# Patient Record
Sex: FEMALE | Race: WHITE | NOT HISPANIC OR LATINO | Employment: FULL TIME | URBAN - NONMETROPOLITAN AREA
[De-identification: names, ages, dates, MRNs, and addresses within clinical notes are randomized per-mention and may not be internally consistent; named-entity substitution may affect disease eponyms.]

---

## 2017-04-23 ENCOUNTER — TRANSCRIBE ORDERS (OUTPATIENT)
Dept: ADMINISTRATIVE | Facility: HOSPITAL | Age: 37
End: 2017-04-23

## 2017-04-23 ENCOUNTER — LAB (OUTPATIENT)
Dept: LAB | Facility: HOSPITAL | Age: 37
End: 2017-04-23

## 2017-04-23 DIAGNOSIS — O00.90 ECTOPIC PREGNANCY, UNSPECIFIED LOCATION, UNSPECIFIED WHETHER INTRAUTERINE PREGNANCY PRESENT: ICD-10-CM

## 2017-04-23 DIAGNOSIS — O00.90 ECTOPIC PREGNANCY, UNSPECIFIED LOCATION, UNSPECIFIED WHETHER INTRAUTERINE PREGNANCY PRESENT: Primary | ICD-10-CM

## 2017-04-23 PROCEDURE — 84702 CHORIONIC GONADOTROPIN TEST: CPT

## 2017-04-23 PROCEDURE — 36415 COLL VENOUS BLD VENIPUNCTURE: CPT

## 2017-04-25 LAB — HCG INTACT+B SERPL-ACNC: 126 MIU/ML

## 2017-04-26 ENCOUNTER — LAB (OUTPATIENT)
Dept: LAB | Facility: HOSPITAL | Age: 37
End: 2017-04-26

## 2017-04-26 DIAGNOSIS — O00.90 ECTOPIC PREGNANCY, UNSPECIFIED LOCATION, UNSPECIFIED WHETHER INTRAUTERINE PREGNANCY PRESENT: ICD-10-CM

## 2017-04-26 LAB — HCG INTACT+B SERPL-ACNC: 45 MIU/ML (ref 0–5)

## 2017-04-26 PROCEDURE — 36415 COLL VENOUS BLD VENIPUNCTURE: CPT

## 2017-04-26 PROCEDURE — 84702 CHORIONIC GONADOTROPIN TEST: CPT | Performed by: OBSTETRICS & GYNECOLOGY

## 2017-05-01 ENCOUNTER — LAB (OUTPATIENT)
Dept: LAB | Facility: HOSPITAL | Age: 37
End: 2017-05-01

## 2017-05-01 ENCOUNTER — TRANSCRIBE ORDERS (OUTPATIENT)
Dept: ADMINISTRATIVE | Facility: HOSPITAL | Age: 37
End: 2017-05-01

## 2017-05-01 DIAGNOSIS — Z87.59 HX OF ECTOPIC PREGNANCY: Primary | ICD-10-CM

## 2017-05-01 DIAGNOSIS — Z87.59 HX OF ECTOPIC PREGNANCY: ICD-10-CM

## 2017-05-01 LAB — HCG INTACT+B SERPL-ACNC: 11 MIU/ML (ref 0–5)

## 2017-05-01 PROCEDURE — 84702 CHORIONIC GONADOTROPIN TEST: CPT | Performed by: OBSTETRICS & GYNECOLOGY

## 2017-05-01 PROCEDURE — 36415 COLL VENOUS BLD VENIPUNCTURE: CPT

## 2017-05-05 ENCOUNTER — TRANSCRIBE ORDERS (OUTPATIENT)
Dept: ADMINISTRATIVE | Facility: HOSPITAL | Age: 37
End: 2017-05-05

## 2017-05-05 ENCOUNTER — LAB (OUTPATIENT)
Dept: LAB | Facility: HOSPITAL | Age: 37
End: 2017-05-05

## 2017-05-05 DIAGNOSIS — O00.90 ECTOPIC PREGNANCY, UNSPECIFIED LOCATION, UNSPECIFIED WHETHER INTRAUTERINE PREGNANCY PRESENT: Primary | ICD-10-CM

## 2017-05-05 DIAGNOSIS — O00.90 ECTOPIC PREGNANCY, UNSPECIFIED LOCATION, UNSPECIFIED WHETHER INTRAUTERINE PREGNANCY PRESENT: ICD-10-CM

## 2017-05-05 LAB — HCG INTACT+B SERPL-ACNC: 5 MIU/ML (ref 0–5)

## 2017-05-05 PROCEDURE — 36415 COLL VENOUS BLD VENIPUNCTURE: CPT

## 2017-05-05 PROCEDURE — 84702 CHORIONIC GONADOTROPIN TEST: CPT | Performed by: OBSTETRICS & GYNECOLOGY

## 2019-04-25 ENCOUNTER — OFFICE VISIT (OUTPATIENT)
Dept: RETAIL CLINIC | Facility: CLINIC | Age: 39
End: 2019-04-25

## 2019-04-25 VITALS
OXYGEN SATURATION: 98 % | TEMPERATURE: 98.9 F | SYSTOLIC BLOOD PRESSURE: 124 MMHG | HEIGHT: 63 IN | WEIGHT: 193 LBS | RESPIRATION RATE: 20 BRPM | HEART RATE: 74 BPM | DIASTOLIC BLOOD PRESSURE: 80 MMHG | BODY MASS INDEX: 34.2 KG/M2

## 2019-04-25 DIAGNOSIS — J02.0 STREP PHARYNGITIS: Primary | ICD-10-CM

## 2019-04-25 LAB
EXPIRATION DATE: ABNORMAL
INTERNAL CONTROL: ABNORMAL
Lab: ABNORMAL
S PYO AG THROAT QL: POSITIVE

## 2019-04-25 PROCEDURE — 99203 OFFICE O/P NEW LOW 30 MIN: CPT | Performed by: NURSE PRACTITIONER

## 2019-04-25 PROCEDURE — 87880 STREP A ASSAY W/OPTIC: CPT | Performed by: NURSE PRACTITIONER

## 2019-04-25 RX ORDER — AMOXICILLIN 875 MG/1
875 TABLET, COATED ORAL 2 TIMES DAILY
Qty: 20 TABLET | Refills: 0 | Status: SHIPPED | OUTPATIENT
Start: 2019-04-25 | End: 2019-05-05

## 2019-04-25 RX ORDER — PNV NO.95/FERROUS FUM/FOLIC AC 28MG-0.8MG
TABLET ORAL
Refills: 2 | COMMUNITY
Start: 2019-03-28 | End: 2020-01-09

## 2019-04-25 RX ORDER — PRENATAL VIT NO.126/IRON/FOLIC 28MG-0.8MG
TABLET ORAL DAILY
COMMUNITY
End: 2020-01-09

## 2019-04-25 NOTE — PROGRESS NOTES
"GERSON@  Kristan Mayers is a 38 y.o. female.   Chief Complaint   Patient presents with   • Sore Throat      Sore Throat    This is a new problem. The current episode started yesterday. The problem has been unchanged. Maximum temperature: hasn't checked her temperature. Associated symptoms include congestion (nasal), swollen glands and trouble swallowing (mild). Pertinent negatives include no coughing or headaches. She has had exposure to strep. Exposure to:  had Strep Throat this past week. She has tried acetaminophen (took Tylenol last PM) for the symptoms. The treatment provided mild relief.        Kristan Mayers  presents to [facility] with cc of sore throat since yesterday evening. Reviewed the [facility]. Immunizations are up to date. See ROS.  The following portions of the patient's history were reviewed and updated as appropriate: allergies, current medications, past family history, past medical history, past social history, past surgical history and problem list.    Current Outpatient Medications:   •  Prenatal Vit-Fe Fumarate-FA (PRENATAL, CLASSIC, VITAMIN) 28-0.8 MG tablet tablet, Take  by mouth Daily., Disp: , Rfl:   •  amoxicillin (AMOXIL) 875 MG tablet, Take 1 tablet by mouth 2 (Two) Times a Day for 10 days., Disp: 20 tablet, Rfl: 0  •  Ferrous Sulfate (IRON) 325 (65 Fe) MG tablet, , Disp: , Rfl: 2    No Known Allergies    Review of Systems   Constitutional: Positive for chills. Negative for fever.   HENT: Positive for congestion (nasal), sore throat and trouble swallowing (mild).    Respiratory: Negative for cough.    Genitourinary: Positive for dysuria.   Musculoskeletal: Positive for myalgias.   Neurological: Negative for headaches.       Objective     Visit Vitals  /80   Pulse 74   Temp 98.9 °F (37.2 °C) (Oral)   Resp 20   Ht 160 cm (63\")   Wt 87.5 kg (193 lb)   SpO2 98%   BMI 34.19 kg/m²         Physical Exam   Constitutional: She is oriented to person, place, and time. She appears " well-developed and well-nourished. No distress.   HENT:   Head: Normocephalic and atraumatic.   Right Ear: Tympanic membrane, external ear and ear canal normal.   Left Ear: Tympanic membrane, external ear and ear canal normal.   Nose: Mucosal edema present.   Mouth/Throat: Uvula is midline and mucous membranes are normal. Posterior oropharyngeal erythema present. No oropharyngeal exudate. Tonsils are 2+ on the right. Tonsils are 2+ on the left. No tonsillar exudate.   Eyes: Conjunctivae and EOM are normal. Pupils are equal, round, and reactive to light.   Neck: Normal range of motion. Neck supple.   Cardiovascular: Normal rate, regular rhythm and normal heart sounds.   Pulmonary/Chest: Effort normal and breath sounds normal. No respiratory distress.   Abdominal: Soft. Bowel sounds are normal.   Lymphadenopathy:     She has no cervical adenopathy.   Neurological: She is alert and oriented to person, place, and time.   Skin: Skin is warm and dry. No rash noted.   Psychiatric: She has a normal mood and affect. Her behavior is normal. Judgment and thought content normal.   Nursing note and vitals reviewed.      Lab Results (last 24 hours)     Procedure Component Value Units Date/Time    POCT rapid strep A [[id number]]  (Abnormal) Collected:  04/25/19 1901    Specimen:  Swab Updated:  04/25/19 1902     Rapid Strep A Screen Positive     Internal Control Passed     Lot Number [id number]     Expiration Date 8/20          Assessment/Plan   Kristan was seen today for sore throat.    Diagnoses and all orders for this visit:    Strep pharyngitis  -     amoxicillin (AMOXIL) 875 MG tablet; Take 1 tablet by mouth 2 (Two) Times a Day for 10 days.  -     POCT rapid strep A

## 2019-04-25 NOTE — PATIENT INSTRUCTIONS
Strep Throat  Strep throat is a bacterial infection of the throat. Your health care provider may call the infection tonsillitis or pharyngitis, depending on whether there is swelling in the tonsils or at the back of the throat. Strep throat is most common during the cold months of the year in children who are 5-15 years of age, but it can happen during any season in people of any age. This infection is spread from person to person (contagious) through coughing, sneezing, or close contact.  What are the causes?  Strep throat is caused by the bacteria called Streptococcus pyogenes.  What increases the risk?  This condition is more likely to develop in:  · People who spend time in crowded places where the infection can spread easily.  · People who have close contact with someone who has strep throat.    What are the signs or symptoms?  Symptoms of this condition include:  · Fever or chills.  · Redness, swelling, or pain in the tonsils or throat.  · Pain or difficulty when swallowing.  · White or yellow spots on the tonsils or throat.  · Swollen, tender glands in the neck or under the jaw.  · Red rash all over the body (rare).    How is this diagnosed?  This condition is diagnosed by performing a rapid strep test or by taking a swab of your throat (throat culture test). Results from a rapid strep test are usually ready in a few minutes, but throat culture test results are available after one or two days.  How is this treated?  This condition is treated with antibiotic medicine.  Follow these instructions at home:  Medicines  · Take over-the-counter and prescription medicines only as told by your health care provider.  · Take your antibiotic as told by your health care provider. Do not stop taking the antibiotic even if you start to feel better.  · Have family members who also have a sore throat or fever tested for strep throat. They may need antibiotics if they have the strep infection.  Eating and drinking  · Do not  share food, drinking cups, or personal items that could cause the infection to spread to other people.  · If swallowing is difficult, try eating soft foods until your sore throat feels better.  · Drink enough fluid to keep your urine clear or pale yellow.  General instructions  · Gargle with a salt-water mixture 3-4 times per day or as needed. To make a salt-water mixture, completely dissolve ½-1 tsp of salt in 1 cup of warm water.  · Make sure that all household members wash their hands well.  · Get plenty of rest.  · Stay home from school or work until you have been taking antibiotics for 24 hours.  · Keep all follow-up visits as told by your health care provider. This is important.  Contact a health care provider if:  · The glands in your neck continue to get bigger.  · You develop a rash, cough, or earache.  · You cough up a thick liquid that is green, yellow-brown, or bloody.  · You have pain or discomfort that does not get better with medicine.  · Your problems seem to be getting worse rather than better.  · You have a fever.  Get help right away if:  · You have new symptoms, such as vomiting, severe headache, stiff or painful neck, chest pain, or shortness of breath.  · You have severe throat pain, drooling, or changes in your voice.  · You have swelling of the neck, or the skin on the neck becomes red and tender.  · You have signs of dehydration, such as fatigue, dry mouth, and decreased urination.  · You become increasingly sleepy, or you cannot wake up completely.  · Your joints become red or painful.  This information is not intended to replace advice given to you by your health care provider. Make sure you discuss any questions you have with your health care provider.  Document Released: 12/15/2001 Document Revised: 08/16/2017 Document Reviewed: 04/11/2016  [employer] Interactive Patient Education © 2019 [employer]

## 2019-12-04 ENCOUNTER — OFFICE VISIT (OUTPATIENT)
Dept: RETAIL CLINIC | Facility: CLINIC | Age: 39
End: 2019-12-04

## 2019-12-04 VITALS
SYSTOLIC BLOOD PRESSURE: 105 MMHG | BODY MASS INDEX: 32.43 KG/M2 | RESPIRATION RATE: 20 BRPM | TEMPERATURE: 99 F | WEIGHT: 183 LBS | DIASTOLIC BLOOD PRESSURE: 70 MMHG | HEIGHT: 63 IN | HEART RATE: 74 BPM | OXYGEN SATURATION: 98 %

## 2019-12-04 DIAGNOSIS — J06.9 ACUTE URI: Primary | ICD-10-CM

## 2019-12-04 LAB
EXPIRATION DATE: NORMAL
EXPIRATION DATE: NORMAL
FLUAV AG NPH QL: NEGATIVE
FLUBV AG NPH QL: NEGATIVE
INTERNAL CONTROL: NORMAL
INTERNAL CONTROL: NORMAL
Lab: NORMAL
Lab: NORMAL
S PYO AG THROAT QL: NEGATIVE

## 2019-12-04 PROCEDURE — 87880 STREP A ASSAY W/OPTIC: CPT | Performed by: NURSE PRACTITIONER

## 2019-12-04 PROCEDURE — 99213 OFFICE O/P EST LOW 20 MIN: CPT | Performed by: NURSE PRACTITIONER

## 2019-12-04 PROCEDURE — 87804 INFLUENZA ASSAY W/OPTIC: CPT | Performed by: NURSE PRACTITIONER

## 2019-12-04 NOTE — PROGRESS NOTES
"SUBJECTIVEBEGIN@  Kristan Mayers is a 39 y.o. female.   Chief Complaint   Patient presents with   • URI      URI    This is a new problem. The current episode started in the past 7 days (2 days). The problem has been waxing and waning. Maximum temperature: unknown. Associated symptoms include congestion (nasal), coughing (dry), headaches, a plugged ear sensation, rhinorrhea (clear), sneezing and a sore throat (mild). Pertinent negatives include no ear pain, joint pain, joint swelling or swollen glands. She has tried decongestant and acetaminophen (had a dose @ 5 am) for the symptoms. The treatment provided no relief.      Kristan Mayers  presents to [facility] with cc of Cold for 2 days. Reviewed the [facility], says she has had her Influenza Vaccine this year.  See ROS.  The following portions of the patient's history were reviewed and updated as appropriate: allergies, current medications, past family history, past medical history, past social history, past surgical history and problem list.    Current Outpatient Medications:   •  Ferrous Sulfate (IRON) 325 (65 Fe) MG tablet, , Disp: , Rfl: 2  •  Prenatal Vit-Fe Fumarate-FA (PRENATAL, CLASSIC, VITAMIN) 28-0.8 MG tablet tablet, Take  by mouth Daily., Disp: , Rfl:     No Known Allergies    Review of Systems   Constitutional: Positive for fatigue. Fever: unknown.   HENT: Positive for congestion (nasal), postnasal drip, rhinorrhea (clear), sneezing and sore throat (mild). Negative for ear pain and sinus pressure.    Respiratory: Positive for cough (dry).    Musculoskeletal: Negative for joint pain.   Neurological: Positive for headaches.       Objective     Visit Vitals  /70   Pulse 74   Temp 99 °F (37.2 °C) (Temporal)   Resp 20   Ht 160 cm (63\")   Wt 83 kg (183 lb)   LMP 11/20/2019 (Exact Date)   SpO2 98%   Breastfeeding? Yes   BMI 32.42 kg/m²         Physical Exam   Constitutional: She is oriented to person, place, and time. She appears well-developed and " well-nourished. No distress.   HENT:   Head: Normocephalic and atraumatic.   Right Ear: Tympanic membrane and external ear normal.   Left Ear: Tympanic membrane and external ear normal.   Nose: Mucosal edema present. Right sinus exhibits no maxillary sinus tenderness and no frontal sinus tenderness. Left sinus exhibits no maxillary sinus tenderness and no frontal sinus tenderness.   Mouth/Throat: Uvula is midline and mucous membranes are normal. Posterior oropharyngeal erythema present. No oropharyngeal exudate or tonsillar abscesses. Tonsils are 1+ on the right. Tonsils are 1+ on the left. No tonsillar exudate.   Eyes: Conjunctivae and EOM are normal. Pupils are equal, round, and reactive to light.   Neck: Normal range of motion. Neck supple.   Cardiovascular: Normal rate, regular rhythm and normal heart sounds.   Pulmonary/Chest: Effort normal and breath sounds normal. No respiratory distress. She has no wheezes.   Abdominal: Soft. Bowel sounds are normal.   Musculoskeletal: Normal range of motion.   Lymphadenopathy:     She has cervical adenopathy (mild tenderness on the left).   Neurological: She is alert and oriented to person, place, and time.   Skin: Skin is warm and dry. No rash noted.   Psychiatric: She has a normal mood and affect. Her behavior is normal. Judgment and thought content normal.   Nursing note and vitals reviewed.      Lab Results (last 24 hours)     Procedure Component Value Units Date/Time    POC Influenza A / B [[id number]]  (Normal) Collected:  12/04/19 1246    Specimen:  Swab Updated:  12/04/19 1246     Rapid Influenza A Ag Negative     Rapid Influenza B Ag Negative     Internal Control Passed     Lot Number [id number]     Expiration Date 10/22/21    POCT rapid strep A [[id number]]  (Normal) Collected:  12/04/19 1247    Specimen:  Swab Updated:  12/04/19 1247     Rapid Strep A Screen Negative     Internal Control Passed     Lot Number [id number]     Expiration Date 2/28/21           Assessment/Plan   Kristan was seen today for uri.    Diagnoses and all orders for this visit:    Acute URI  -     POCT rapid strep A  -     POC Influenza A / B

## 2019-12-04 NOTE — PATIENT INSTRUCTIONS

## 2020-01-09 ENCOUNTER — OFFICE VISIT (OUTPATIENT)
Dept: RETAIL CLINIC | Facility: CLINIC | Age: 40
End: 2020-01-09

## 2020-01-09 VITALS
WEIGHT: 182 LBS | OXYGEN SATURATION: 98 % | DIASTOLIC BLOOD PRESSURE: 88 MMHG | RESPIRATION RATE: 18 BRPM | BODY MASS INDEX: 32.25 KG/M2 | HEART RATE: 78 BPM | TEMPERATURE: 99.2 F | SYSTOLIC BLOOD PRESSURE: 112 MMHG | HEIGHT: 63 IN

## 2020-01-09 DIAGNOSIS — J06.9 URI, ACUTE: Primary | ICD-10-CM

## 2020-01-09 DIAGNOSIS — R11.0 NAUSEA: ICD-10-CM

## 2020-01-09 LAB
EXPIRATION DATE: NORMAL
FLUAV AG NPH QL: NEGATIVE
FLUBV AG NPH QL: NEGATIVE
INTERNAL CONTROL: NORMAL
Lab: NORMAL

## 2020-01-09 PROCEDURE — 87804 INFLUENZA ASSAY W/OPTIC: CPT | Performed by: NURSE PRACTITIONER

## 2020-01-09 PROCEDURE — 99213 OFFICE O/P EST LOW 20 MIN: CPT | Performed by: NURSE PRACTITIONER

## 2020-01-09 RX ORDER — PROMETHAZINE HYDROCHLORIDE 12.5 MG/1
12.5 TABLET ORAL EVERY 6 HOURS PRN
Qty: 20 TABLET | Refills: 0 | Status: SHIPPED | OUTPATIENT
Start: 2020-01-09

## 2020-01-09 NOTE — PATIENT INSTRUCTIONS
Nausea, Adult  Nausea is feeling sick to your stomach or feeling that you are about to throw up (vomit). Feeling sick to your stomach is usually not serious, but it may be an early sign of a more serious medical problem.  As you feel sicker to your stomach, you may throw up. If you throw up, or if you are not able to drink enough fluids, there is a risk that you may lose too much water in your body (get dehydrated). If you lose too much water in your body, you may:  · Feel tired.  · Feel thirsty.  · Have a dry mouth.  · Have cracked lips.  · Go pee (urinate) less often.  Older adults and people who have other diseases or a weak body defense system (immune system) have a higher risk of losing too much water in the body.  The main goals of treating this condition are:  · To relieve your nausea.  · To ensure your nausea occurs less often.  · To prevent throwing up and losing too much fluid.  Follow these instructions at home:  Watch your symptoms for any changes. Tell your doctor about them. Follow these instructions as told by your doctor.  Eating and drinking         · Take an ORS (oral rehydration solution). This is a drink that is sold at pharmacies and stores.  · Drink clear fluids in small amounts as you are able. These include:  ? Water.  ? Ice chips.  ? Fruit juice that has water added (diluted fruit juice).  ? Low-calorie sports drinks.  · Eat bland, easy-to-digest foods in small amounts as you are able, such as:  ? Bananas.  ? Applesauce.  ? Rice.  ? Low-fat (lean) meats.  ? Toast.  ? Crackers.  · Avoid drinking fluids that have a lot of sugar or caffeine in them. This includes energy drinks, sports drinks, and soda.  · Avoid alcohol.  · Avoid spicy or fatty foods.  General instructions  · Take over-the-counter and prescription medicines only as told by your doctor.  · Rest at home while you get better.  · Drink enough fluid to keep your pee (urine) pale yellow.  · Take slow and deep breaths when you feel  sick to your stomach.  · Avoid food or things that have strong smells.  · Wash your hands often with soap and water. If you cannot use soap and water, use hand .  · Make sure that all people in your home wash their hands well and often.  · Keep all follow-up visits as told by your doctor. This is important.  Contact a doctor if:  · You feel sicker to your stomach.  · You feel sick to your stomach for more than 2 days.  · You throw up.  · You are not able to drink fluids without throwing up.  · You have new symptoms.  · You have a fever.  · You have a headache.  · You have muscle cramps.  · You have a rash.  · You have pain while peeing.  · You feel light-headed or dizzy.  Get help right away if:  · You have pain in your chest, neck, arm, or jaw.  · You feel very weak or you pass out (faint).  · You have throw up that is bright red or looks like coffee grounds.  · You have bloody or black poop (stools) or poop that looks like tar.  · You have a very bad headache, a stiff neck, or both.  · You have very bad pain, cramping, or bloating in your belly (abdomen).  · You have trouble breathing or you are breathing very quickly.  · Your heart is beating very quickly.  · Your skin feels cold and clammy.  · You feel confused.  · You have signs of losing too much water in your body, such as:  ? Dark pee, very little pee, or no pee.  ? Cracked lips.  ? Dry mouth.  ? Sunken eyes.  ? Sleepiness.  ? Weakness.  These symptoms may be an emergency. Do not wait to see if the symptoms will go away. Get medical help right away. Call your local emergency services (911 in the [place] Do not drive yourself to the hospital.  Summary  · Nausea is feeling sick to your stomach or feeling that you are about to throw up (vomit).  · If you throw up, or if you are not able to drink enough fluids, there is a risk that you may lose too much water in your body (get dehydrated).  · Eat and drink what your doctor tells you. Take  "over-the-counter and prescription medicines only as told by your doctor.  · Contact a doctor right away if your symptoms get worse or you have new symptoms.  · Keep all follow-up visits as told by your doctor. This is important.  This information is not intended to replace advice given to you by your health care provider. Make sure you discuss any questions you have with your health care provider.  Document Released: 12/06/2012 Document Revised: 05/28/2019 Document Reviewed: 05/28/2019  [employer] Interactive Patient Education © 2019 [employer]    Upper Respiratory Infection, Adult  An upper respiratory infection (URI) affects the nose, throat, and upper air passages. URIs are caused by germs (viruses). The most common type of URI is often called \"the common cold.\"  Medicines cannot cure URIs, but you can do things at home to relieve your symptoms. URIs usually get better within 7-10 days.  Follow these instructions at home:  Activity  · Rest as needed.  · If you have a fever, stay home from work or school until your fever is gone, or until your doctor says you may return to work or school.  ? You should stay home until you cannot spread the infection anymore (you are not contagious).  ? Your doctor may have you wear a face mask so you have less risk of spreading the infection.  Relieving symptoms  · Gargle with a salt-water mixture 3-4 times a day or as needed. To make a salt-water mixture, completely dissolve ½-1 tsp of salt in 1 cup of warm water.  · Use a cool-mist humidifier to add moisture to the air. This can help you breathe more easily.  Eating and drinking    · Drink enough fluid to keep your pee (urine) pale yellow.  · Eat soups and other clear broths.  General instructions    · Take over-the-counter and prescription medicines only as told by your doctor. These include cold medicines, fever reducers, and cough suppressants.  · Do not use any products that contain nicotine or tobacco. These include " "cigarettes and e-cigarettes. If you need help quitting, ask your doctor.  · Avoid being where people are smoking (avoid secondhand smoke).  · Make sure you get regular shots and get the flu shot every year.  · Keep all follow-up visits as told by your doctor. This is important.  How to avoid spreading infection to others    · Wash your hands often with soap and water. If you do not have soap and water, use hand .  · Avoid touching your mouth, face, eyes, or nose.  · Cough or sneeze into a tissue or your sleeve or elbow. Do not cough or sneeze into your hand or into the air.  Contact a doctor if:  · You are getting worse, not better.  · You have any of these:  ? A fever.  ? Chills.  ? Brown or red mucus in your nose.  ? Yellow or brown fluid (discharge)coming from your nose.  ? Pain in your face, especially when you bend forward.  ? Swollen neck glands.  ? Pain with swallowing.  ? White areas in the back of your throat.  Get help right away if:  · You have shortness of breath that gets worse.  · You have very bad or constant:  ? Headache.  ? Ear pain.  ? Pain in your forehead, behind your eyes, and over your cheekbones (sinus pain).  ? Chest pain.  · You have long-lasting (chronic) lung disease along with any of these:  ? Wheezing.  ? Long-lasting cough.  ? Coughing up blood.  ? A change in your usual mucus.  · You have a stiff neck.  · You have changes in your:  ? Vision.  ? Hearing.  ? Thinking.  ? Mood.  Summary  · An upper respiratory infection (URI) is caused by a germ called a virus. The most common type of URI is often called \"the common cold.\"  · URIs usually get better within 7-10 days.  · Take over-the-counter and prescription medicines only as told by your doctor.  This information is not intended to replace advice given to you by your health care provider. Make sure you discuss any questions you have with your health care provider.  Document Released: 06/05/2009 Document Revised: 08/10/2018 " Document Reviewed: 08/10/2018  [employer] Interactive Patient Education © 2019 [employer]

## 2020-01-09 NOTE — PROGRESS NOTES
Subjective   Kristan Mayers is a 39 y.o. female.   Chief Complaint   Patient presents with   • Flu Symptoms      Flu Symptoms   This is a new problem. The current episode started yesterday. The problem occurs constantly. The problem has been unchanged. Associated symptoms include congestion, fatigue, a fever, myalgias and nausea. Pertinent negatives include no coughing, sore throat or vomiting. Nothing aggravates the symptoms. She has tried NSAIDs for the symptoms. The treatment provided no relief.        The following portions of the patient's history were reviewed and updated as appropriate: allergies, current medications, past family history, past medical history, past social history, past surgical history and problem list.    Review of Systems   Constitutional: Positive for fatigue and fever.   HENT: Positive for congestion. Negative for sinus pressure, sinus pain and sore throat.    Eyes: Negative.    Respiratory: Negative.  Negative for cough.    Gastrointestinal: Positive for nausea. Negative for vomiting.   Genitourinary: Negative.    Musculoskeletal: Positive for myalgias.   Skin: Negative.    Allergic/Immunologic: Negative.    Psychiatric/Behavioral: Negative.    All other systems reviewed and are negative.      Objective   No Known Allergies    Physical Exam   Constitutional: She is oriented to person, place, and time. She appears well-developed and well-nourished.   HENT:   Right Ear: Tympanic membrane normal.   Left Ear: Tympanic membrane normal.   Nose: Mucosal edema present.   Mouth/Throat: Oropharynx is clear and moist.   Eyes: Pupils are equal, round, and reactive to light.   Neck: Neck supple.   Cardiovascular: Normal rate and regular rhythm.   Pulmonary/Chest: Effort normal and breath sounds normal.   Abdominal: Soft. Bowel sounds are increased. There is no hepatosplenomegaly. There is no tenderness. There is no rigidity, no rebound, no guarding and no CVA tenderness.   Musculoskeletal: Normal  range of motion.   Lymphadenopathy:     She has no cervical adenopathy.   Neurological: She is alert and oriented to person, place, and time.   Skin: Skin is warm and dry. No rash noted.   Psychiatric: She has a normal mood and affect.   Vitals reviewed.      Assessment/Plan   Kristan was seen today for flu symptoms.    Diagnoses and all orders for this visit:    URI, acute  -     POC Influenza A / B    Nausea    Other orders  -     promethazine (PHENERGAN) 12.5 MG tablet; Take 1 tablet by mouth Every 6 (Six) Hours As Needed for Nausea or Vomiting.                Results for orders placed or performed in visit on 01/09/20   POC Influenza A / B   Result Value Ref Range    Rapid Influenza A Ag Negative Negative    Rapid Influenza B Ag Negative Negative    Internal Control Passed Passed    Lot Number [id number]     Expiration Date 12/11/21          This document has been electronically signed by DANIEL Kulkarni January 9, 2020 4:05 PM

## 2021-05-11 ENCOUNTER — LAB (OUTPATIENT)
Dept: LAB | Facility: HOSPITAL | Age: 41
End: 2021-05-11

## 2021-05-11 ENCOUNTER — TRANSCRIBE ORDERS (OUTPATIENT)
Dept: ADMINISTRATIVE | Facility: HOSPITAL | Age: 41
End: 2021-05-11

## 2021-05-11 DIAGNOSIS — R53.83 TIREDNESS: Primary | ICD-10-CM

## 2021-05-11 DIAGNOSIS — R53.83 TIREDNESS: ICD-10-CM

## 2021-05-11 DIAGNOSIS — M25.50 PAIN IN JOINT, MULTIPLE SITES: ICD-10-CM

## 2021-05-11 DIAGNOSIS — Z13.220 SCREENING FOR LIPOID DISORDERS: ICD-10-CM

## 2021-05-11 PROCEDURE — 80053 COMPREHEN METABOLIC PANEL: CPT

## 2021-05-11 PROCEDURE — 82670 ASSAY OF TOTAL ESTRADIOL: CPT

## 2021-05-11 PROCEDURE — 86431 RHEUMATOID FACTOR QUANT: CPT

## 2021-05-11 PROCEDURE — 85652 RBC SED RATE AUTOMATED: CPT

## 2021-05-11 PROCEDURE — 83001 ASSAY OF GONADOTROPIN (FSH): CPT

## 2021-05-11 PROCEDURE — 83540 ASSAY OF IRON: CPT

## 2021-05-11 PROCEDURE — 84466 ASSAY OF TRANSFERRIN: CPT

## 2021-05-11 PROCEDURE — 80061 LIPID PANEL: CPT

## 2021-05-11 PROCEDURE — 86038 ANTINUCLEAR ANTIBODIES: CPT

## 2021-05-11 PROCEDURE — 82607 VITAMIN B-12: CPT

## 2021-05-11 PROCEDURE — 86140 C-REACTIVE PROTEIN: CPT

## 2021-05-11 PROCEDURE — 36415 COLL VENOUS BLD VENIPUNCTURE: CPT

## 2021-05-11 PROCEDURE — 84443 ASSAY THYROID STIM HORMONE: CPT

## 2021-05-11 PROCEDURE — 84146 ASSAY OF PROLACTIN: CPT

## 2021-05-11 PROCEDURE — 82306 VITAMIN D 25 HYDROXY: CPT

## 2021-05-11 PROCEDURE — 85025 COMPLETE CBC W/AUTO DIFF WBC: CPT

## 2021-05-11 PROCEDURE — 83002 ASSAY OF GONADOTROPIN (LH): CPT

## 2021-06-18 ENCOUNTER — APPOINTMENT (OUTPATIENT)
Dept: MAMMOGRAPHY | Facility: HOSPITAL | Age: 41
End: 2021-06-18

## 2021-07-09 ENCOUNTER — HOSPITAL ENCOUNTER (OUTPATIENT)
Dept: MAMMOGRAPHY | Facility: HOSPITAL | Age: 41
Discharge: HOME | End: 2021-07-09
Admitting: NURSE PRACTITIONER

## 2021-07-09 DIAGNOSIS — Z12.31 VISIT FOR SCREENING MAMMOGRAM: ICD-10-CM

## 2021-07-09 PROCEDURE — 77063 BREAST TOMOSYNTHESIS BI: CPT | Performed by: RADIOLOGY

## 2021-07-09 PROCEDURE — 77067 SCR MAMMO BI INCL CAD: CPT

## 2021-07-09 PROCEDURE — 77063 BREAST TOMOSYNTHESIS BI: CPT

## 2021-07-09 PROCEDURE — 77067 SCR MAMMO BI INCL CAD: CPT | Performed by: RADIOLOGY

## 2022-03-29 ENCOUNTER — TRANSCRIBE ORDERS (OUTPATIENT)
Dept: OTHER | Facility: OTHER | Age: 42
End: 2022-03-29

## 2022-03-29 ENCOUNTER — LAB (OUTPATIENT)
Dept: LAB | Facility: HOSPITAL | Age: 42
End: 2022-03-29

## 2022-03-29 DIAGNOSIS — E55.9 AVITAMINOSIS D: ICD-10-CM

## 2022-03-29 DIAGNOSIS — R53.83 TIREDNESS: Primary | ICD-10-CM

## 2022-03-29 DIAGNOSIS — R53.83 TIREDNESS: ICD-10-CM

## 2022-03-29 PROCEDURE — 82306 VITAMIN D 25 HYDROXY: CPT

## 2022-03-29 PROCEDURE — 36415 COLL VENOUS BLD VENIPUNCTURE: CPT

## 2022-03-29 PROCEDURE — 82607 VITAMIN B-12: CPT

## 2022-03-30 LAB
25(OH)D3 SERPL-MCNC: 40.3 NG/ML (ref 30–100)
VIT B12 BLD-MCNC: 971 PG/ML (ref 211–946)

## 2022-08-05 ENCOUNTER — HOSPITAL ENCOUNTER (OUTPATIENT)
Dept: MAMMOGRAPHY | Facility: HOSPITAL | Age: 42
Discharge: HOME | End: 2022-08-05
Admitting: NURSE PRACTITIONER

## 2022-08-05 DIAGNOSIS — Z12.31 VISIT FOR SCREENING MAMMOGRAM: ICD-10-CM

## 2022-08-05 PROCEDURE — 77063 BREAST TOMOSYNTHESIS BI: CPT | Performed by: RADIOLOGY

## 2022-08-05 PROCEDURE — 77063 BREAST TOMOSYNTHESIS BI: CPT

## 2022-08-05 PROCEDURE — 77067 SCR MAMMO BI INCL CAD: CPT | Performed by: RADIOLOGY

## 2022-08-05 PROCEDURE — 77067 SCR MAMMO BI INCL CAD: CPT

## 2023-08-23 ENCOUNTER — HOSPITAL ENCOUNTER (OUTPATIENT)
Dept: MAMMOGRAPHY | Facility: HOSPITAL | Age: 43
Discharge: HOME | End: 2023-08-23
Admitting: NURSE PRACTITIONER
Payer: COMMERCIAL

## 2023-08-23 DIAGNOSIS — Z12.31 VISIT FOR SCREENING MAMMOGRAM: ICD-10-CM

## 2023-08-23 PROCEDURE — 77063 BREAST TOMOSYNTHESIS BI: CPT

## 2023-08-23 PROCEDURE — 77067 SCR MAMMO BI INCL CAD: CPT

## 2024-07-18 ENCOUNTER — TRANSCRIBE ORDERS (OUTPATIENT)
Dept: ADMINISTRATIVE | Facility: HOSPITAL | Age: 44
End: 2024-07-18
Payer: COMMERCIAL

## 2024-07-18 DIAGNOSIS — Z12.31 SCREENING MAMMOGRAM, ENCOUNTER FOR: Primary | ICD-10-CM

## 2024-08-26 ENCOUNTER — HOSPITAL ENCOUNTER (OUTPATIENT)
Dept: MAMMOGRAPHY | Facility: HOSPITAL | Age: 44
Discharge: HOME | End: 2024-08-26
Admitting: NURSE PRACTITIONER
Payer: COMMERCIAL

## 2024-08-26 DIAGNOSIS — Z12.31 SCREENING MAMMOGRAM, ENCOUNTER FOR: ICD-10-CM

## 2024-08-26 PROCEDURE — 77063 BREAST TOMOSYNTHESIS BI: CPT

## 2024-08-26 PROCEDURE — 77067 SCR MAMMO BI INCL CAD: CPT

## 2025-04-25 ENCOUNTER — OFFICE VISIT (OUTPATIENT)
Age: 45
End: 2025-04-25
Payer: COMMERCIAL

## 2025-04-25 VITALS — HEIGHT: 63 IN | WEIGHT: 180 LBS | BODY MASS INDEX: 31.89 KG/M2

## 2025-04-25 DIAGNOSIS — L72.0 EPIDERMAL INCLUSION CYST: Primary | ICD-10-CM

## 2025-04-25 NOTE — PROGRESS NOTES
Date of Service: 4/25/2025    Subjective   Kristan Mayers is a 44 y.o. female is being in consultation today at the request of Gertrude Rosales APRN    Chief Complaint: Skin cyst above right breast    Kristan Mayers is a 44 y.o. female with a cyst present above her right breast.  She noted an area above her right breast that she could squeeze and get productive material out.  After squeezing it significantly, she noted that it was very red, painful and swollen.  She went to a dermatologist and this was lanced open.    She presents today with a history consistent with an epidermal inclusion cyst.  She also notes all cystic structures present on her back.    Past Medical History:   Diagnosis Date    Strep pharyngitis        Past Surgical History:   Procedure Laterality Date    VAGINAL DELIVERY  04/19/2015; 5/20/19    female born at 36 weeks gestation, female     WISDOM TOOTH EXTRACTION           Family History   Problem Relation Age of Onset    Cancer Mother     Cancer Maternal Grandmother     Stroke Maternal Grandmother     Cancer Maternal Grandfather     Breast cancer Neg Hx          Social History     Socioeconomic History    Marital status:    Tobacco Use    Smoking status: Never     Passive exposure: Never    Smokeless tobacco: Never   Vaping Use    Vaping status: Never Used   Substance and Sexual Activity    Alcohol use: No    Drug use: No    Sexual activity: Yes     Birth control/protection: None     Comment:  has 2 child         Review of Systems   Pertinent items are noted in HPI     Constitutional: No fevers, chills or malaise. No unintentional weight loss   Eyes: Denies visual changes    Cardiovascular: Denies chest pain, palpitations   Respiratory: Denies cough or shortness of breath   Abdominal/Gastrointestinal: No abdominal pain, no nausea/vomiting   Genitourinary: Denies dysuria or hematuria   Musculoskeletal: Denies any chronic joint aches, pains or deformities              Skin:  "See HPI   Psychiatric: No recent mood changes   Neurologic: No paresthesias or loss of function        Objective       Physical Exam:      04/25/25  0948   Weight: 81.6 kg (180 lb)    Body mass index is 31.89 kg/m².  Constitution: Ht 160 cm (63\")   Wt 81.6 kg (180 lb)   BMI 31.89 kg/m²  . No acute distress  Head: Normocephalic, atraumatic.   Eyes: Aligned without strabismus. Conjunctivae noninjected   Ears, Nose, Mouth:no lesions noted  CV: Regular rate and rhythm   Respiratory: Symmetric chest expansion. No respiratory distress.   Gastrointestinal:  Soft, nontender, nondistended   Skin: 2 cm lesion present on medial superior aspect of her right breast.  Small cystic lesions present on her back.  Neurologic: No gross deficits.  Alert and oriented x3  Psychiatric: Normal mood    Assessment   Diagnoses and all orders for this visit:    1. Epidermal inclusion cyst (Primary)      Kristan Mayers is a 44 y.o. female with an epidermal inclusion cyst.  This was recently infected and has been healing.  I offered excision in clinic versus excision in the operating room vs continued monitoring to see if it bothers her again.  At this time, she would like to continue to watch it.  She will notify us should it start to bother her again.         Aide Hlae MD  [facility]    "